# Patient Record
Sex: FEMALE | Race: WHITE | NOT HISPANIC OR LATINO | URBAN - METROPOLITAN AREA
[De-identification: names, ages, dates, MRNs, and addresses within clinical notes are randomized per-mention and may not be internally consistent; named-entity substitution may affect disease eponyms.]

---

## 2017-04-17 ENCOUNTER — OUTPATIENT (OUTPATIENT)
Dept: OUTPATIENT SERVICES | Facility: HOSPITAL | Age: 82
LOS: 1 days | Discharge: HOME | End: 2017-04-17

## 2017-04-17 DIAGNOSIS — Z87.01 PERSONAL HISTORY OF PNEUMONIA (RECURRENT): ICD-10-CM

## 2017-04-17 DIAGNOSIS — I10 ESSENTIAL (PRIMARY) HYPERTENSION: ICD-10-CM

## 2017-04-17 DIAGNOSIS — I48.91 UNSPECIFIED ATRIAL FIBRILLATION: ICD-10-CM

## 2017-04-17 DIAGNOSIS — Z85.828 PERSONAL HISTORY OF OTHER MALIGNANT NEOPLASM OF SKIN: ICD-10-CM

## 2017-04-17 DIAGNOSIS — Z79.01 LONG TERM (CURRENT) USE OF ANTICOAGULANTS: ICD-10-CM

## 2017-04-17 DIAGNOSIS — H02.105 UNSPECIFIED ECTROPION OF LEFT LOWER EYELID: ICD-10-CM

## 2017-04-17 DIAGNOSIS — Z85.3 PERSONAL HISTORY OF MALIGNANT NEOPLASM OF BREAST: ICD-10-CM

## 2017-04-17 DIAGNOSIS — Z95.2 PRESENCE OF PROSTHETIC HEART VALVE: ICD-10-CM

## 2017-04-17 DIAGNOSIS — Z96.641 PRESENCE OF RIGHT ARTIFICIAL HIP JOINT: ICD-10-CM

## 2017-04-17 DIAGNOSIS — Z87.891 PERSONAL HISTORY OF NICOTINE DEPENDENCE: ICD-10-CM

## 2017-05-01 ENCOUNTER — OUTPATIENT (OUTPATIENT)
Dept: OUTPATIENT SERVICES | Facility: HOSPITAL | Age: 82
LOS: 1 days | Discharge: HOME | End: 2017-05-01

## 2017-05-08 DIAGNOSIS — Z96.641 PRESENCE OF RIGHT ARTIFICIAL HIP JOINT: ICD-10-CM

## 2017-05-08 DIAGNOSIS — E78.00 PURE HYPERCHOLESTEROLEMIA, UNSPECIFIED: ICD-10-CM

## 2017-05-08 DIAGNOSIS — Z85.820 PERSONAL HISTORY OF MALIGNANT MELANOMA OF SKIN: ICD-10-CM

## 2017-05-08 DIAGNOSIS — H02.102 UNSPECIFIED ECTROPION OF RIGHT LOWER EYELID: ICD-10-CM

## 2017-05-08 DIAGNOSIS — Z79.01 LONG TERM (CURRENT) USE OF ANTICOAGULANTS: ICD-10-CM

## 2017-05-08 DIAGNOSIS — Z95.2 PRESENCE OF PROSTHETIC HEART VALVE: ICD-10-CM

## 2019-01-01 ENCOUNTER — EMERGENCY (EMERGENCY)
Facility: HOSPITAL | Age: 84
LOS: 1 days | Discharge: HOME | End: 2019-01-01
Attending: EMERGENCY MEDICINE | Admitting: EMERGENCY MEDICINE
Payer: MEDICARE

## 2019-01-01 DIAGNOSIS — I46.9 CARDIAC ARREST, CAUSE UNSPECIFIED: ICD-10-CM

## 2019-01-01 PROCEDURE — 99285 EMERGENCY DEPT VISIT HI MDM: CPT | Mod: 25

## 2019-01-01 PROCEDURE — 92950 HEART/LUNG RESUSCITATION CPR: CPT

## 2019-01-01 PROCEDURE — 99291 CRITICAL CARE FIRST HOUR: CPT | Mod: 25

## 2019-10-22 NOTE — ED ADULT NURSE NOTE - OBJECTIVE STATEMENT
pt was found unresponsive and pulseless by nursing home staff.    See documentation on code sheet.       Time of death 1113 called by Dr. Dallas.   pt's daughter at bedside at time of death.

## 2019-10-22 NOTE — ED PROVIDER NOTE - CRITICAL CARE PROVIDED
additional history taking/consult w/ pt's family directly relating to pts condition/direct patient care (not related to procedure)/interpretation of diagnostic studies/documentation

## 2019-10-22 NOTE — ED PROVIDER NOTE - CLINICAL SUMMARY MEDICAL DECISION MAKING FREE TEXT BOX
82F pmh per [facility] chart paroxysmal afib, SCC, NSTEMI, heart failured unspec depression, insomnia, anemia, prior UTI, prior COLBY BIBA from [facility] nursing facility after found in cardaic arrest. Downtime 15m prior to EMS arrival, received multiple epi and AED shocks w/ CPR prior to EMS arrival. Upon arrival by ems reported at 10:28am, CPR continued, pt transported to ED and received 5x AED shocks and 4x epi, intubated w/ capnography ~35 during cpr. Upon arrival to ED pt unresponsive, intubated. Daughter arrived as well, notes pt full code. After multiple rounds of epi, cpr, amiodarone, calcium, no pulse, no shockable rhythm (PEA), and no cardiac activity on bedside US, team queried for add'l thoughts/plans. Agreed unlikely to have meaningful recovery. Family brought in for discussion during final cpr. Agreed w/ plan for cessation of effort as likely futile. Time of death 11:13am 10/22/2019.  available for assistance. Further discussion w/ entire family regarding her death in grievance room.

## 2019-10-22 NOTE — ED PROVIDER NOTE - PROGRESS NOTE DETAILS
confirmed w/ Medical examiner, not a case:   medical examiner Discussed w/ 15:16 emilie. Case rejected 15:32 as nonreportable, examiner Mallory lees

## 2019-10-22 NOTE — ED PROVIDER NOTE - PHYSICAL EXAMINATION
VITAL SIGNS: I have reviewed nursing notes and confirm.  CONSTITUTIONAL: Well-developed; in extremis, unresponsive.  SKIN: Skin is cool & dry  HEAD: Normocephalic; atraumatic.  EYES: pupils unresponsive, fixed  ENT: No nasal discharge; airway suctioned of yellow fluid  NECK: Supple; Voluntary FROM  CARD: no carotid or femoral pulse. +pulsation w/ cpr  RESP: b/l w/ bvm, no spontaneous respirations  ABD: nondistended  EXT: no deformities or large wounds  NEURO: unresponsive  psych: unable to assess 2/2 cardiac arrest

## 2019-10-22 NOTE — ED PROVIDER NOTE - OBJECTIVE STATEMENT
82F pmh per [facility] chart paroxysmal afib, SCC, NSTEMI, heart failured unspec depression, insomnia, anemia, prior UTI, prior COLBY BIBA from [facility] nursing facility after found in cardaic arrest. Downtime 15m prior to EMS arrival, received multiple epi and AED shocks w/ CPR prior to EMS arrival. Upon arrival by ems reported at 10:28am, CPR continued, pt transported to ED and received 5x AED shocks and 4x epi, intubated w/ capnography ~35 during cpr. Upon arrival to ED pt unresponsive, intubated. Daughter arrived as well, notes pt full code.

## 2019-10-22 NOTE — ED ADULT TRIAGE NOTE - CHIEF COMPLAINT QUOTE
pt was found unresponsive by NH, pt was shocked 4x's, medics arrived pt was shocked an additions 5x's and given 4 amps of Epi. pt arrived to ER, CPR in progress. pt intubated with a 7.5cm tube & 27" at the lip.

## 2021-06-07 NOTE — ED ADULT NURSE NOTE - NS ED NURSE RECORD ANOTHER VITAL SIGN
Yes Implemented All Fall with Harm Risk Interventions:  Edgar to call system. Call bell, personal items and telephone within reach. Instruct patient to call for assistance. Room bathroom lighting operational. Non-slip footwear when patient is off stretcher. Physically safe environment: no spills, clutter or unnecessary equipment. Stretcher in lowest position, wheels locked, appropriate side rails in place. Provide visual cue, wrist band, yellow gown, etc. Monitor gait and stability. Monitor for mental status changes and reorient to person, place, and time. Review medications for side effects contributing to fall risk. Reinforce activity limits and safety measures with patient and family. Provide visual clues: red socks.